# Patient Record
Sex: FEMALE | Race: WHITE
[De-identification: names, ages, dates, MRNs, and addresses within clinical notes are randomized per-mention and may not be internally consistent; named-entity substitution may affect disease eponyms.]

---

## 2022-09-22 ENCOUNTER — NON-APPOINTMENT (OUTPATIENT)
Age: 77
End: 2022-09-22

## 2022-09-22 ENCOUNTER — APPOINTMENT (OUTPATIENT)
Dept: VASCULAR SURGERY | Facility: CLINIC | Age: 77
End: 2022-09-22

## 2022-09-22 DIAGNOSIS — Z83.3 FAMILY HISTORY OF DIABETES MELLITUS: ICD-10-CM

## 2022-09-22 DIAGNOSIS — E78.5 HYPERLIPIDEMIA, UNSPECIFIED: ICD-10-CM

## 2022-09-22 DIAGNOSIS — Z85.3 PERSONAL HISTORY OF MALIGNANT NEOPLASM OF BREAST: ICD-10-CM

## 2022-09-22 DIAGNOSIS — I35.0 NONRHEUMATIC AORTIC (VALVE) STENOSIS: ICD-10-CM

## 2022-09-22 DIAGNOSIS — Z82.49 FAMILY HISTORY OF ISCHEMIC HEART DISEASE AND OTHER DISEASES OF THE CIRCULATORY SYSTEM: ICD-10-CM

## 2022-09-22 DIAGNOSIS — Z82.3 FAMILY HISTORY OF STROKE: ICD-10-CM

## 2022-09-22 DIAGNOSIS — I65.23 OCCLUSION AND STENOSIS OF BILATERAL CAROTID ARTERIES: ICD-10-CM

## 2022-09-22 DIAGNOSIS — I10 ESSENTIAL (PRIMARY) HYPERTENSION: ICD-10-CM

## 2022-09-22 DIAGNOSIS — E03.9 HYPOTHYROIDISM, UNSPECIFIED: ICD-10-CM

## 2022-09-22 DIAGNOSIS — Z85.828 PERSONAL HISTORY OF OTHER MALIGNANT NEOPLASM OF SKIN: ICD-10-CM

## 2022-09-22 PROBLEM — Z00.00 ENCOUNTER FOR PREVENTIVE HEALTH EXAMINATION: Status: ACTIVE | Noted: 2022-09-22

## 2022-09-22 PROCEDURE — 99203 OFFICE O/P NEW LOW 30 MIN: CPT

## 2022-09-22 RX ORDER — ATENOLOL 25 MG/1
25 TABLET ORAL
Refills: 0 | Status: ACTIVE | COMMUNITY

## 2022-09-22 RX ORDER — LEVOTHYROXINE SODIUM 200 MCG
VIAL (EA) INTRAVENOUS
Refills: 0 | Status: ACTIVE | COMMUNITY

## 2022-09-22 RX ORDER — ASPIRIN 81 MG
81 TABLET, DELAYED RELEASE (ENTERIC COATED) ORAL
Refills: 0 | Status: ACTIVE | COMMUNITY

## 2022-09-22 RX ORDER — AMLODIPINE BESYLATE 2.5 MG/1
2.5 TABLET ORAL
Refills: 0 | Status: ACTIVE | COMMUNITY

## 2022-09-22 RX ORDER — ATORVASTATIN CALCIUM 40 MG/1
40 TABLET, FILM COATED ORAL
Refills: 0 | Status: ACTIVE | COMMUNITY

## 2022-09-22 RX ORDER — LOSARTAN POTASSIUM AND HYDROCHLOROTHIAZIDE 12.5; 5 MG/1; MG/1
50-12.5 TABLET ORAL
Refills: 0 | Status: ACTIVE | COMMUNITY

## 2022-09-22 NOTE — DATA REVIEWED
[FreeTextEntry1] : US DUP ART CAROTID/EXTRACRAN/TEMPORAL BI \par  \par Clinical statement: 11476-F88.89: Oth symptoms and signs involving the circ and \par resp systems,    C/C:r carotid bruit.  AI/AS  \par   \par Comparison: None \par   \par Ultrasound imaging of the carotid and vertebral arteries was performed utilizing \par gray scale and spectral color Doppler technique.   \par   \par RIGHT:  \par Moderate to severe calcified plaque at the carotid bifurcation resulting in \par luminal narrowing, elevated velocity and spectral broadening consistent with \par 70-79% stenosis at the carotid bifurcation and proximal internal carotid artery. \par   \par   \par Additional narrowing at the origin of the external carotid artery.  \par   \par Proximal common carotid artery PSV 63.3 cm/s \par Mid common carotid artery PSV 71.5 cm/s \par Distal common carotid artery PSV 65.7 cm/s  \par Bulb .0 cm/s; EDV 29.9 cm/s \par Prox Internal carotid artery .0 cm/s; EDV 20.6 cm/s \par Mid Internal carotid artery .0 cm/s; EDV 20.1 cm/s \par Distal Internal carotid artery .0 cm/s \par External carotid artery  .0 cm/s  \par Vertebral artery   antegrade \par ICA/CCA   4.58 \par IMT 0.1 cm \par   \par   \par LEFT:  \par Small noncalcified plaque in the mid common carotid artery without luminal \par narrowing.  \par   \par Additional moderate soft plaque at the carotid bifurcation with luminal \par narrowing, elevated velocities and spectral broadening consistent with a 70-79% \par stenosis. There is subtle dampening of the downstream systolic upstroke of the \par distal internal carotid artery. \par   \par Proximal common carotid artery PSV 80.6 cm/s \par Mid common carotid artery PSV 79.8 cm/s \par Distal common carotid artery PSV 76.6 cm/s  \par Bulb .0 cm/s; EDV 62.9 cm/s \par Proximal Internal carotid artery PSV  316.0 cm/s; EDV 80.4 cm/s \par Mid Internal carotid artery .0 cm/s; EDV 54.3 cm/s \par Distal Internal carotid artery PSV  191.0 cm/s \par External carotid artery  PSV 91.1 cm/s  \par Vertebral artery antegrade   \par ICA/CCA  4.1 \par IMT 0.5  cm \par   \par IMPRESSION:  \par   \par Bilateral 70-79% stenosis at the carotid bifurcation by NASCET criteria, as \par described in detail within the body of the report.   \par   \par   \par Electronically Signed in [employer] By Dr. Alicia Gates MD on 8/17/2022 5:14 \par PM

## 2022-09-22 NOTE — HISTORY OF PRESENT ILLNESS
[FreeTextEntry1] : 77-year-old female referred by Dr. Gonzalez for evaluation of carotid stenosis.\par She was establishing care with a PCP and cardiologist, carotid bruit was heard, and subsequently she underwent a carotid ultrasound which revealed bilateral carotid stenosis.\par She denies a prior history of stroke or TIA.  Her father did suffer from a stroke.  She suffers from aortic stenosis, does not have a history of coronary artery disease or PCI.  She takes an aspirin and statin on a daily basis.  She denies recent or remote symptoms to suggest neurological events, denies vision or speech problems, upper or lower extremity weakness or numbness.

## 2022-09-22 NOTE — CONSULT LETTER
[Dear  ___] : Dear  [unfilled], [Consult Letter:] : I had the pleasure of evaluating your patient, [unfilled]. [Please see my note below.] : Please see my note below. [Consult Closing:] : Thank you very much for allowing me to participate in the care of this patient.  If you have any questions, please do not hesitate to contact me. [Sincerely,] : Sincerely, [FreeTextEntry3] : Roland Elizondo MD, RPVI

## 2022-09-22 NOTE — ASSESSMENT
[FreeTextEntry1] : 77-year-old female found to have bilateral severe carotid stenosis on duplex.  She is asymptomatic from the standpoint, does not have recent or remote history of CVA or TIA.  I discussed the natural history of carotid disease, as well as the indications for surgical intervention, which includes asymptomatic severe stenosis.  We will obtain a CT angiogram to corroborate ultrasound findings, as well as assess surgical accessibility of the lesion and intracranial circulation.  She will follow-up with me after testing performed.  If CTA does corroborate findings of high-grade stenosis, she will benefit from carotid endarterectomy, which we have discussed already.\par She will continue best medical management with a daily aspirin and statin, as well as blood pressure control.\par She will follow-up with me after CTA testing performed.

## 2022-09-22 NOTE — PHYSICAL EXAM
[Normal Breath Sounds] : Normal breath sounds [2+] : left 2+ [Alert] : alert [Oriented to Person] : oriented to person [Oriented to Place] : oriented to place [Calm] : calm [de-identified] : NAD [de-identified] : NCAT [de-identified] : supple [de-identified] : Soft, nontender, nondistended, no palpable masses

## 2022-10-21 ENCOUNTER — APPOINTMENT (OUTPATIENT)
Dept: VASCULAR SURGERY | Facility: CLINIC | Age: 77
End: 2022-10-21

## 2022-10-21 PROCEDURE — 99214 OFFICE O/P EST MOD 30 MIN: CPT

## 2023-03-05 ENCOUNTER — TRANSCRIPTION ENCOUNTER (OUTPATIENT)
Age: 78
End: 2023-03-05